# Patient Record
Sex: FEMALE | Race: WHITE | URBAN - NONMETROPOLITAN AREA
[De-identification: names, ages, dates, MRNs, and addresses within clinical notes are randomized per-mention and may not be internally consistent; named-entity substitution may affect disease eponyms.]

---

## 2021-03-23 ENCOUNTER — IMPORTED ENCOUNTER (OUTPATIENT)
Dept: URBAN - NONMETROPOLITAN AREA CLINIC 1 | Facility: CLINIC | Age: 31
End: 2021-03-23

## 2021-03-23 PROBLEM — H16.223: Noted: 2021-03-23

## 2021-03-23 PROBLEM — H52.223: Noted: 2021-03-23

## 2021-03-23 PROBLEM — H52.13: Noted: 2021-03-23

## 2021-03-23 PROCEDURE — 92015 DETERMINE REFRACTIVE STATE: CPT

## 2021-03-23 PROCEDURE — 92004 COMPRE OPH EXAM NEW PT 1/>: CPT

## 2021-03-23 NOTE — PATIENT DISCUSSION
Compound Hyperopic Astigmatsim OU-  discussed findings w/ patient -  new spectacle Rx issued today-  continue to monitor yearly or prnDES OU-  discussed findings w/ patient -  start Refresh Reliva BID OU samples given-  stressed the importance of good hydration and frequent breaks from the computer-  continue to monitor; 's Notes: MR 3/23/2021DFE 3/23/2021

## 2022-04-10 ASSESSMENT — VISUAL ACUITY
OD_SC: 20/20
OS_SC: 20/20-2
OU_CC: 20/20
OU_SC: 20/20

## 2022-04-10 ASSESSMENT — TONOMETRY
OS_IOP_MMHG: 13
OD_IOP_MMHG: 13